# Patient Record
Sex: FEMALE | Race: ASIAN | Employment: FULL TIME | URBAN - METROPOLITAN AREA
[De-identification: names, ages, dates, MRNs, and addresses within clinical notes are randomized per-mention and may not be internally consistent; named-entity substitution may affect disease eponyms.]

---

## 2017-11-02 ENCOUNTER — HOSPITAL ENCOUNTER (OUTPATIENT)
Age: 42
Setting detail: OUTPATIENT SURGERY
Discharge: HOME | End: 2017-11-02
Attending: SPECIALIST | Admitting: SPECIALIST
Payer: COMMERCIAL

## 2017-11-02 VITALS
SYSTOLIC BLOOD PRESSURE: 119 MMHG | HEART RATE: 63 BPM | RESPIRATION RATE: 16 BRPM | DIASTOLIC BLOOD PRESSURE: 66 MMHG | HEIGHT: 66 IN | WEIGHT: 139 LBS | OXYGEN SATURATION: 100 % | BODY MASS INDEX: 22.34 KG/M2

## 2017-11-02 PROCEDURE — 76040000007: Performed by: SPECIALIST

## 2017-11-02 PROCEDURE — 77030020268 HC MISC GENERAL SUPPLY: Performed by: SPECIALIST

## 2017-11-02 NOTE — IP AVS SNAPSHOT
[address] [facility] 
[phone] Patient: Juan Damon MRN: [id number] :1975 About your hospitalization You were admitted on:  2017 You last received care in the:  [facility] ENDOSCOPY You were discharged on:  2017 Why you were hospitalized Your primary diagnosis was:  Not on File Discharge Orders None A check nayla indicates which time of day the medication should be taken. My Medications ASK your physician about these medications Instructions Each Dose to Equal  
 Morning Noon Evening Bedtime M-VIT PO Your last dose was: Your next dose is: Take  by mouth. STRESS WITH IRON tablet Generic drug:  multivitamin, stress formula Your last dose was: Your next dose is: Take 1 Tab by mouth daily. 1 Tab Discharge Instructions Juan Damon 
[id number] 
1975 MANOMETRY DISCHARGE INSTRUCTION You may resume your regular diet as tolerated. You may resume your normal daily activities. Call your Physician if you have any complications or questions. PeakÂ® Activation Thank you for requesting access to PeakÂ®. Please follow the instructions below to securely access and download your online medical record. PeakÂ® allows you to send messages to your doctor, view your test results, renew your prescriptions, schedule appointments, and more. How Do I Sign Up? 1. In your internet browser, go to [email or web address] 
2. Click on the First Time User? Click Here link in the Sign In box. You will be redirect to the New Member Sign Up page. 3. Enter your PeakÂ® Access Code exactly as it appears below. You will not need to use this code after youve completed the sign-up process.  If you do not sign up before the expiration date, you must request a new code. fav.or.it Access Code: [email or web address] Expires: 2018  1:45 PM (This is the date your fav.or.it access code will ) 4. Enter the last four digits of your Social Security Number (xxxx) and Date of Birth (mm/dd/yyyy) as indicated and click Submit. You will be taken to the next sign-up page. 5. Create a fav.or.it ID. This will be your fav.or.it login ID and cannot be changed, so think of one that is secure and easy to remember. 6. Create a fav.or.it password. You can change your password at any time. 7. Enter your Password Reset Question and Answer. This can be used at a later time if you forget your password. 8. Enter your e-mail address. You will receive e-mail notification when new information is available in [address] 9. Click Sign Up. You can now view and download portions of your medical record. 10. Click the Download Summary menu link to download a portable copy of your medical information. Additional Information If you have questions, please visit the Frequently Asked Questions section of the fav.or.it website at [email or web address] Remember, fav.or.it is NOT to be used for urgent needs. For medical emergencies, dial 911. Introducing [facility]! Giuseppe Blas introduces fav.or.it patient portal. Now you can access parts of your medical record, email your doctor's office, and request medication refills online. 1. In your internet browser, go to [email or web address] 2. Click on the First Time User? Click Here link in the Sign In box. You will see the New Member Sign Up page. 3. Enter your fav.or.it Access Code exactly as it appears below. You will not need to use this code after youve completed the sign-up process. If you do not sign up before the expiration date, you must request a new code.  
 
· fav.or.it Access Code: [facility] 
 Expires: 1/31/2018  1:45 PM 
 
4. Enter the last four digits of your Social Security Number (xxxx) and Date of Birth (mm/dd/yyyy) as indicated and click Submit. You will be taken to the next sign-up page. 5. Create a [employer] ID. This will be your [employer] login ID and cannot be changed, so think of one that is secure and easy to remember. 6. Create a [employer] password. You can change your password at any time. 7. Enter your Password Reset Question and Answer. This can be used at a later time if you forget your password. 8. Enter your e-mail address. You will receive e-mail notification when new information is available in [address] 9. Click Sign Up. You can now view and download portions of your medical record. 10. Click the Download Summary menu link to download a portable copy of your medical information. If you have questions, please visit the Frequently Asked Questions section of the [employer] website. Remember, [employer] is NOT to be used for urgent needs. For medical emergencies, dial 911. Now available from your iPhone and Android! Providers Seen During Your Hospitalization Provider Specialty Primary office phone Alexei Espinoza MD Gastroenterology [phone] Your Primary Care Physician (PCP) Primary Care Physician Office Phone Office Fax [facility] [phone] You are allergic to the following Allergen Reactions Sulfa (Sulfonamide Antibiotics) Unknown (comments) Increased urinary frequency Recent Documentation Height Weight Breastfeeding? BMI OB Status Smoking Status 1.676 m 63 kg No 22.44 kg/m2 Having regular periods Never Smoker Emergency Contacts Name Discharge Info Relation Home Work Mobile [address] CAREGIVER [3] Spouse [3] [phone] Patient Belongings The following personal items are in your possession at time of discharge: Dental Appliances: None  Visual Aid: None Please provide this summary of care documentation to your next provider. Signatures-by signing, you are acknowledging that this After Visit Summary has been reviewed with you and you have received a copy. Patient Signature:  ____________________________________________________________ Date:  ____________________________________________________________  
  
Aloma Snellen Provider Signature:  ____________________________________________________________ Date:  ____________________________________________________________

## 2017-11-02 NOTE — DISCHARGE INSTRUCTIONS
Cecilia Arrieta  [id number]  1975      MANOMETRY DISCHARGE INSTRUCTION    You may resume your regular diet as tolerated. You may resume your normal daily activities. Call your Physician if you have any complications or questions. Add2paper Activation    Thank you for requesting access to Add2paper. Please follow the instructions below to securely access and download your online medical record. Add2paper allows you to send messages to your doctor, view your test results, renew your prescriptions, schedule appointments, and more. How Do I Sign Up? 1. In your internet browser, go to [email or web address]  2. Click on the First Time User? Click Here link in the Sign In box. You will be redirect to the New Member Sign Up page. 3. Enter your Add2paper Access Code exactly as it appears below. You will not need to use this code after youve completed the sign-up process. If you do not sign up before the expiration date, you must request a new code. Add2paper Access Code: [email or web address]  Expires: 2018  1:45 PM (This is the date your Add2paper access code will )    4. Enter the last four digits of your Social Security Number (xxxx) and Date of Birth (mm/dd/yyyy) as indicated and click Submit. You will be taken to the next sign-up page. 5. Create a Add2paper ID. This will be your Add2paper login ID and cannot be changed, so think of one that is secure and easy to remember. 6. Create a Add2paper password. You can change your password at any time. 7. Enter your Password Reset Question and Answer. This can be used at a later time if you forget your password. 8. Enter your e-mail address. You will receive e-mail notification when new information is available in [address] 9. Click Sign Up. You can now view and download portions of your medical record. 10. Click the Download Summary menu link to download a portable copy of your medical information.     Additional Information    If you have questions, please visit the Frequently Asked Questions section of the MyColorScreen website at [email or web address] Remember, MyColorScreen is NOT to be used for urgent needs. For medical emergencies, dial 911.

## 2017-11-02 NOTE — PROGRESS NOTES
Rectal exam done by Camila Hill RN. Anal manometry catheter inserted into rectum. Manometry procedure complete. Catheter inserted into rectum. Balloon filled with 40 cc of luke warm H2O, and pt escorted to bathroom for 3 min expulsion test.  Pt was able to expel balloon. Balloon deflated and catheter removed. Pt tolerated procedures well.

## 2017-11-14 NOTE — OP NOTES
[address]   OP NOTE       Name:  Susie Carr   MR#:  [id number]   :  1975   Account #:  [de-identified]    Surgery Date:  2017   Date of Adm:  2017       PREPROCEDURE DIAGNOSIS: Constipation. POSTOPERATIVE DIAGNOSES    1. Balloon expulsion is passed. 2. Borderline adequate squeeze. 3. Abnormal sensory findings, see below. PROCEDURES PLANNED AND PERFORMED    PLANNED AND PERFORMED    1. High-resolution anorectal manometry. 2. Assessment of anorectal function with balloon. SPECIMENS: None. ANESTHESIA: None. ESTIMATED BLOOD LOSS: None. DESCRIPTION OF PROCEDURE: High-resolution anorectal   manometry was performed by the nursing staff with subsequent   interpretation by Dr. Karin Cameron. The sphincter pressures are measured   rectal and abdominal reference mean and max. Rectal reference   pressure max 74.1, mean 65.5. Maximum abdominal pressure 70.2. Mean abdominal pressure 61.6. Normals are greater than 30 mmHg,   so these are normal. The patient is then asked to voluntarily squeeze. Normals will increase squeeze pressure by more than 30 mm and   sustain for more than 10 seconds. She sustains for 15 seconds;   however, increases maximum rectal reference pressure only to 100.3   (less than 30 mm, which is normal) and increases abdominal reference   pressure to exactly 30 mm reference over abdominal reference   maximum at 102.2 mmHg. The patient is then asked to push or bear down. Residual anal   pressure decreases 34% to 53.3 mmHg. Rectal anal pressure   differential at this time is -17.5 and intrarectal pressure is 35.7. The balloon is then utilized. Balloon expulsion is passed. Rectal anal   inhibitory reflex is present. The first sensation to rectal filling is at 70   mL. Normal should be less than 20 mL. The urge to defecate is at 120   mL; normal should be at  mL.  Maximum discomfort is at 200   mL, normal should be about 180 mL. The sphincter is borderline weak. The sensory findings demonstrate impaired sensitivity to all types of   filling. This could be a denervated rectum, a capacious vault. RECOMMENDATIONS    1. Transanal manometry to look for focal defect. 2. Pelvic floor training. 3. Defecating dynamic MRI.          MD SHAYNA Amezcua / IRWIN   D:  11/13/2017   20:56   T:  11/14/2017   07:22   Job #:  [id number]

## 2023-05-01 ENCOUNTER — TRANSCRIBE ORDER (OUTPATIENT)
Dept: SCHEDULING | Age: 48
End: 2023-05-01

## 2023-05-01 DIAGNOSIS — N91.2 AMENORRHEA: Primary | ICD-10-CM

## 2023-05-08 DIAGNOSIS — N91.2 AMENORRHEA: Primary | ICD-10-CM

## 2023-05-10 ENCOUNTER — TRANSCRIBE ORDERS (OUTPATIENT)
Facility: HOSPITAL | Age: 48
End: 2023-05-10

## 2023-05-10 DIAGNOSIS — N91.2 AMENORRHEA: Primary | ICD-10-CM

## 2025-04-14 ENCOUNTER — TRANSCRIBE ORDERS (OUTPATIENT)
Facility: HOSPITAL | Age: 50
End: 2025-04-14

## 2025-04-14 DIAGNOSIS — Z13.9 SCREENING DUE: Primary | ICD-10-CM

## 2025-04-15 ENCOUNTER — HOSPITAL ENCOUNTER (OUTPATIENT)
Facility: HOSPITAL | Age: 50
Discharge: HOME | End: 2025-04-18

## 2025-04-15 DIAGNOSIS — Z13.9 SCREENING DUE: ICD-10-CM

## 2025-04-15 PROCEDURE — 75571 CT HRT W/O DYE W/CA TEST: CPT

## (undated) DEVICE — Device

## (undated) DEVICE — STOPCOCK IV 4 W TRNSPAR

## (undated) DEVICE — SYRINGE 50ML E/T

## (undated) DEVICE — BASIN EMESIS 500CC ROSE 250/CS 60/PLT: Brand: MEDEGEN MEDICAL PRODUCTS, LLC